# Patient Record
Sex: MALE | Race: WHITE | Employment: UNEMPLOYED | URBAN - METROPOLITAN AREA
[De-identification: names, ages, dates, MRNs, and addresses within clinical notes are randomized per-mention and may not be internally consistent; named-entity substitution may affect disease eponyms.]

---

## 2018-05-25 ENCOUNTER — OFFICE VISIT (OUTPATIENT)
Dept: FAMILY MEDICINE | Facility: CLINIC | Age: 43
End: 2018-05-25
Payer: COMMERCIAL

## 2018-05-25 VITALS
HEART RATE: 78 BPM | DIASTOLIC BLOOD PRESSURE: 80 MMHG | OXYGEN SATURATION: 98 % | SYSTOLIC BLOOD PRESSURE: 115 MMHG | HEIGHT: 65 IN | WEIGHT: 150 LBS | TEMPERATURE: 98 F | BODY MASS INDEX: 24.99 KG/M2

## 2018-05-25 DIAGNOSIS — Z00.00 ENCOUNTER FOR GENERAL ADULT MEDICAL EXAMINATION WITHOUT ABNORMAL FINDINGS: Primary | ICD-10-CM

## 2018-05-25 DIAGNOSIS — Z13.1 SCREENING FOR DIABETES MELLITUS: ICD-10-CM

## 2018-05-25 DIAGNOSIS — Z82.49 FAMILY HISTORY OF EARLY CAD: ICD-10-CM

## 2018-05-25 DIAGNOSIS — E78.5 HYPERLIPIDEMIA LDL GOAL <130: ICD-10-CM

## 2018-05-25 LAB
ALBUMIN SERPL-MCNC: 4.2 G/DL (ref 3.4–5)
ALP SERPL-CCNC: 107 U/L (ref 40–150)
ALT SERPL W P-5'-P-CCNC: 16 U/L (ref 0–70)
ANION GAP SERPL CALCULATED.3IONS-SCNC: 8 MMOL/L (ref 3–14)
AST SERPL W P-5'-P-CCNC: 18 U/L (ref 0–45)
BILIRUB SERPL-MCNC: 0.7 MG/DL (ref 0.2–1.3)
BUN SERPL-MCNC: 8 MG/DL (ref 7–30)
CALCIUM SERPL-MCNC: 9.2 MG/DL (ref 8.5–10.1)
CHLORIDE SERPL-SCNC: 103 MMOL/L (ref 94–109)
CHOLEST SERPL-MCNC: 275 MG/DL
CO2 SERPL-SCNC: 27 MMOL/L (ref 20–32)
CREAT SERPL-MCNC: 0.77 MG/DL (ref 0.66–1.25)
GFR SERPL CREATININE-BSD FRML MDRD: >90 ML/MIN/1.7M2
GLUCOSE SERPL-MCNC: 75 MG/DL (ref 70–99)
HDLC SERPL-MCNC: 45 MG/DL
LDLC SERPL CALC-MCNC: 215 MG/DL
NONHDLC SERPL-MCNC: 230 MG/DL
POTASSIUM SERPL-SCNC: 3.9 MMOL/L (ref 3.4–5.3)
PROT SERPL-MCNC: 7.9 G/DL (ref 6.8–8.8)
SODIUM SERPL-SCNC: 138 MMOL/L (ref 133–144)
TRIGL SERPL-MCNC: 77 MG/DL

## 2018-05-25 PROCEDURE — 99396 PREV VISIT EST AGE 40-64: CPT | Performed by: INTERNAL MEDICINE

## 2018-05-25 PROCEDURE — 80053 COMPREHEN METABOLIC PANEL: CPT | Performed by: INTERNAL MEDICINE

## 2018-05-25 PROCEDURE — 80061 LIPID PANEL: CPT | Performed by: INTERNAL MEDICINE

## 2018-05-25 PROCEDURE — 36415 COLL VENOUS BLD VENIPUNCTURE: CPT | Performed by: INTERNAL MEDICINE

## 2018-05-25 NOTE — PROGRESS NOTES
SUBJECTIVE:   CC: Shiv Terrell is an 43 year old male who presents for preventative health visit.     Healthy Habits:    Do you get at least three servings of calcium containing foods daily (dairy, green leafy vegetables, etc.)? yes    Amount of exercise or daily activities, outside of work: 1-2 day(s) per week    Problems taking medications regularly not applicable    Medication side effects: No    Have you had an eye exam in the past two years? no    Do you see a dentist twice per year? no    Do you have sleep apnea, excessive snoring or daytime drowsiness?yes     Blood Work: A1C, LDL  He is following ketogenic diet for a few months.  Father  age 48 of MI.  He was non-smoker.  He had no other medical problems, he weighed 250 lbs.  HCM:  Discussed HIV test, he declined      Today's PHQ-2 Score:   PHQ-2 (  [employer]) 2012   Q1: Little interest or pleasure in doing things 0   Q2: Feeling down, depressed or hopeless 0   PHQ-2 Score 0       Abuse: Current or Past(Physical, Sexual or Emotional)- No  Do you feel safe in your environment - Yes    Social History   Substance Use Topics     Smoking status: Never Smoker     Smokeless tobacco: Never Used     Alcohol use No      If you drink alcohol do you typically have >3 drinks per day or >7 drinks per week? No                      Last PSA: No results found for: PSA    Reviewed orders with patient. Reviewed health maintenance and updated orders accordingly - Yes  No current outpatient prescriptions on file.       Reviewed and updated as needed this visit by clinical staff  Tobacco  Allergies  Meds  Med Hx  Surg Hx  Fam Hx  Soc Hx        Reviewed and updated as needed this visit by Provider  Tobacco  Fam Hx  Soc Hx         ROS:  CONSTITUTIONAL: NEGATIVE for fever, chills, change in weight  INTEGUMENTARY/SKIN: NEGATIVE for worrisome rashes, moles or lesions  EYES: NEGATIVE for vision changes or irritation  ENT: NEGATIVE for ear, mouth and throat  "problems  RESP: NEGATIVE for significant cough or SOB  CV: NEGATIVE for chest pain, palpitations or peripheral edema  GI: NEGATIVE for nausea, abdominal pain, heartburn, or change in bowel habits   male: negative for dysuria, hematuria, decreased urinary stream, erectile dysfunction, urethral discharge  MUSCULOSKELETAL: NEGATIVE for significant arthralgias or myalgia  NEURO: NEGATIVE for weakness, dizziness or paresthesias  PSYCHIATRIC: NEGATIVE for changes in mood or affect    OBJECTIVE:   /80 (BP Location: Right arm, Patient Position: Chair, Cuff Size: Adult Regular)  Pulse 78  Temp 98  F (36.7  C) (Tympanic)  Ht 5' 5.35\" (1.66 m)  Wt 150 lb (68 kg)  SpO2 98%  BMI 24.69 kg/m2  EXAM:  GENERAL: healthy, alert and no distress  EYES: Eyes grossly normal to inspection, PERRL and conjunctivae and sclerae normal  HENT: ear canals and TM's normal, nose and mouth without ulcers or lesions  NECK: no adenopathy, no asymmetry, masses, or scars and thyroid normal to palpation  RESP: lungs clear to auscultation - no rales, rhonchi or wheezes  CV: regular rate and rhythm, normal S1 S2, no S3 or S4, no murmur, click or rub, no peripheral edema and peripheral pulses strong  ABDOMEN: soft, nontender, no hepatosplenomegaly, no masses and bowel sounds normal  MS: no gross musculoskeletal defects noted, no edema  SKIN: no suspicious lesions or rashes  NEURO: Normal strength and tone, mentation intact and speech normal  PSYCH: mentation appears normal, affect normal/bright    ASSESSMENT/PLAN:   1. Encounter for general adult medical examination without abnormal findings    2. Family history of early CAD - given that his father  of MI age 48 with no other risk factors except obesity, if his cholesterol returns high again, then I would recommend CT Coronary Calcium score to better risk stratify him.  He is not seeming very interested in statin, but could readdress depending on how high his lipids are  - Comprehensive " "metabolic panel    3. Hyperlipidemia LDL goal <130  - Lipid panel reflex to direct LDL Fasting  - Comprehensive metabolic panel    4. Screening for diabetes mellitus  - Comprehensive metabolic panel    COUNSELING:  Reviewed preventive health counseling, as reflected in patient instructions       reports that he has never smoked. He has never used smokeless tobacco.    Estimated body mass index is 24.69 kg/(m^2) as calculated from the following:    Height as of this encounter: 5' 5.35\" (1.66 m).    Weight as of this encounter: 150 lb (68 kg).       Counseling Resources:  ATP IV Guidelines  Pooled Cohorts Equation Calculator  FRAX Risk Assessment  ICSI Preventive Guidelines  Dietary Guidelines for Americans, 2010  USDA's MyPlate  ASA Prophylaxis  Lung CA Screening    Genoveva Dawn,   [facility]  "

## 2018-05-25 NOTE — MR AVS SNAPSHOT
After Visit Summary   5/25/2018    Shiv Terrell    MRN: [id number]           Patient Information     Date Of Birth          1975        Visit Information        Provider Department      5/25/2018 2:00 PM Genoveva Dawn,  [facility]        Today's Diagnoses     Encounter for general adult medical examination without abnormal findings    -  1    Family history of early CAD        Hyperlipidemia LDL goal <130        Screening for diabetes mellitus          Care Instructions      Preventive Health Recommendations  Male Ages 40 to 49    Yearly exam:             See your health care provider every year in order to  o   Review health changes.   o   Discuss preventive care.    o   Review your medicines if your doctor has prescribed any.    You should be tested each year for STDs (sexually transmitted diseases) if you re at risk.     Have a cholesterol test every 5 years.     Have a colonoscopy (test for colon cancer) if someone in your family has had colon cancer or polyps before age 50.     After age 45, have a diabetes test (fasting glucose). If you are at risk for diabetes, you should have this test every 3 years.      Talk with your health care provider about whether or not a prostate cancer screening test (PSA) is right for you.    Shots: Get a flu shot each year. Get a tetanus shot every 10 years.     Nutrition:    Eat at least 5 servings of fruits and vegetables daily.     Eat whole-grain bread, whole-wheat pasta and brown rice instead of white grains and rice.     Talk to your provider about Calcium and Vitamin D.     Lifestyle    Exercise for at least 150 minutes a week (30 minutes a day, 5 days a week). This will help you control your weight and prevent disease.     Limit alcohol to one drink per day.     No smoking.     Wear sunscreen to prevent skin cancer.     See your dentist every six months for an exam and cleaning.      1. Blood work today  2. If cholesterol remains  "high, Dr. Dawn would recommend a CT calcium test to help determine your risk of Cardiovascular disease.          Follow-ups after your visit        Who to contact     If you have questions or need follow up information about today's clinic visit or your schedule please contact [facility] directly at [phone].  Normal or non-critical lab and imaging results will be communicated to you by MyChart, letter or phone within 4 business days after the clinic has received the results. If you do not hear from us within 7 days, please contact the clinic through MyChart or phone. If you have a critical or abnormal lab result, we will notify you by phone as soon as possible.  Submit refill requests through [facility] or call your pharmacy and they will forward the refill request to us. Please allow 3 business days for your refill to be completed.          Additional Information About Your Visit        MyChart Information     [facility] lets you send messages to your doctor, view your test results, renew your prescriptions, schedule appointments and more. To sign up, go to [email or web address] . Click on \"Log in\" on the left side of the screen, which will take you to the Welcome page. Then click on \"Sign up Now\" on the right side of the page.     You will be asked to enter the access code listed below, as well as some personal information. Please follow the directions to create your username and password.     Your access code is: [email or web address]  Expires: 2018  2:01 PM     Your access code will  in 90 days. If you need help or a new code, please call your [facility] or [phone].        Care EveryWhere ID     This is your Care EveryWhere ID. This could be used by other organizations to access your [place] medical records  [id number]        Your Vitals Were     Pulse Temperature Height Pulse Oximetry BMI (Body Mass Index)       78 98  F (36.7  C) (Tympanic) 5' 5.35\" (1.66 m) 98% 24.69 kg/m2     "    Blood Pressure from Last 3 Encounters:   05/25/18 115/80   12/28/16 116/72   03/24/15 126/68    Weight from Last 3 Encounters:   05/25/18 150 lb (68 kg)   12/28/16 170 lb 6.4 oz (77.3 kg)   03/24/15 160 lb 3.2 oz (72.7 kg)              We Performed the Following     Comprehensive metabolic panel     Lipid panel reflex to direct LDL Fasting        Primary Care Provider Office Phone # Fax #    Lori Misty Heath [phone]       [address] [address]        Equal Access to Services     ASHWINI SOLOMON : Hadii zenobia Love, wafredada mayi, qaybta kaalmada michael, jose carlos mcneal. So [facility] [phone].    ATENCIÓN: Si habla español, tiene a chavez disposición servicios gratuitos de asistencia lingüística. [facility] [phone].    We comply with applicable federal civil rights laws and [place] laws. We do not discriminate on the basis of race, color, national origin, age, disability, sex, sexual orientation, or gender identity.            Thank you!     Thank you for choosing [facility]  for your care. Our goal is always to provide you with excellent care. Hearing back from our patients is one way we can continue to improve our services. Please take a few minutes to complete the written survey that you may receive in the mail after your visit with us. Thank you!             Your Updated Medication List - Protect others around you: Learn how to safely use, store and throw away your medicines at www.disposemymeds.org.      Notice  As of 5/25/2018  2:50 PM    You have not been prescribed any medications.

## 2018-05-25 NOTE — PATIENT INSTRUCTIONS
Preventive Health Recommendations  Male Ages 40 to 49    Yearly exam:             See your health care provider every year in order to  o   Review health changes.   o   Discuss preventive care.    o   Review your medicines if your doctor has prescribed any.    You should be tested each year for STDs (sexually transmitted diseases) if you re at risk.     Have a cholesterol test every 5 years.     Have a colonoscopy (test for colon cancer) if someone in your family has had colon cancer or polyps before age 50.     After age 45, have a diabetes test (fasting glucose). If you are at risk for diabetes, you should have this test every 3 years.      Talk with your health care provider about whether or not a prostate cancer screening test (PSA) is right for you.    Shots: Get a flu shot each year. Get a tetanus shot every 10 years.     Nutrition:    Eat at least 5 servings of fruits and vegetables daily.     Eat whole-grain bread, whole-wheat pasta and brown rice instead of white grains and rice.     Talk to your provider about Calcium and Vitamin D.     Lifestyle    Exercise for at least 150 minutes a week (30 minutes a day, 5 days a week). This will help you control your weight and prevent disease.     Limit alcohol to one drink per day.     No smoking.     Wear sunscreen to prevent skin cancer.     See your dentist every six months for an exam and cleaning.      1. Blood work today  2. If cholesterol remains high, Dr. Dawn would recommend a CT calcium test to help determine your risk of Cardiovascular disease.

## 2018-05-25 NOTE — LETTER
May 29, 2018      Shiv Terrell  [address] [address] [facility] [place] [facility] [address]        Dear ,    We are writing to inform you of your test results.  Kidney, liver function and electrolytes are normal.    Resulted Orders   Lipid panel reflex to direct LDL Fasting   Result Value Ref Range    Cholesterol 275 (H) <200 mg/dL      Comment:      Desirable:       <200 mg/dl    Triglycerides 77 <150 mg/dL      Comment:      Fasting specimen    HDL Cholesterol 45 >39 mg/dL    LDL Cholesterol Calculated 215 (H) <100 mg/dL      Comment:      Above desirable:  100-129 mg/dl  Borderline High:  130-159 mg/dL  High:             160-189 mg/dL  Very high:       >189 mg/dl      Non HDL Cholesterol 230 (H) <130 mg/dL      Comment:      Above Desirable:  130-159 mg/dl  Borderline high:  160-189 mg/dl  High:             190-219 mg/dl  Very high:       >219 mg/dl     Comprehensive metabolic panel   Result Value Ref Range    Sodium 138 133 - 144 mmol/L    Potassium 3.9 3.4 - 5.3 mmol/L    Chloride 103 94 - 109 mmol/L    Carbon Dioxide 27 20 - 32 mmol/L    Anion Gap 8 3 - 14 mmol/L    Glucose 75 70 - 99 mg/dL      Comment:      Fasting specimen    Urea Nitrogen 8 7 - 30 mg/dL    Creatinine 0.77 0.66 - 1.25 mg/dL    GFR Estimate >90 >60 mL/min/1.7m2      Comment:      Non  GFR Calc    GFR Estimate If Black >90 >60 mL/min/1.7m2      Comment:       GFR Calc    Calcium 9.2 8.5 - 10.1 mg/dL    Bilirubin Total 0.7 0.2 - 1.3 mg/dL    Albumin 4.2 3.4 - 5.0 g/dL    Protein Total 7.9 6.8 - 8.8 g/dL    Alkaline Phosphatase 107 40 - 150 U/L    ALT 16 0 - 70 U/L    AST 18 0 - 45 U/L       If you have any questions or concerns, please call the clinic at the number listed above.       Sincerely,        Genoveva Dawn, DO

## 2019-03-11 ENCOUNTER — TELEPHONE (OUTPATIENT)
Dept: FAMILY MEDICINE | Facility: CLINIC | Age: 44
End: 2019-03-11

## 2019-03-11 DIAGNOSIS — Z82.49 FAMILY HISTORY OF EARLY CAD: Primary | ICD-10-CM

## 2019-03-11 DIAGNOSIS — E78.5 HYPERLIPIDEMIA LDL GOAL <130: ICD-10-CM

## 2019-03-11 NOTE — TELEPHONE ENCOUNTER
Reason for Call: Request for an order or referral:    Order or referral being requested: Labs, Lipid panel and calcium    Date needed: as soon as possible    Has the patient been seen by the PCP for this problem? YES    Additional comments: Pt has appointment 3/20/19 with Dr Dawn. He wants his labs ahead of time.  Please call when order is placed.    Phone number Patient can be reached at:  Cell number on file:    Telephone Information:   Mobile [phone]       Best Time:  any    Can we leave a detailed message on this number?  YES    Call taken on 3/11/2019 at 11:13 AM by Carly Doyle

## 2019-03-11 NOTE — TELEPHONE ENCOUNTER
Patient informed of message below from provider.  Scheduled for 3/14/19 11:05 am fasting lab only appointment.

## 2019-03-11 NOTE — TELEPHONE ENCOUNTER
Pended CMP and Lipids. Please place any others if needed.     Please ask patient when calling back if patient would like to elect Dr. Dawn as PCP.    Provider please review and advise. Thank you.

## 2019-03-14 DIAGNOSIS — E78.5 HYPERLIPIDEMIA LDL GOAL <130: ICD-10-CM

## 2019-03-14 DIAGNOSIS — Z82.49 FAMILY HISTORY OF EARLY CAD: ICD-10-CM

## 2019-03-14 LAB
ALBUMIN SERPL-MCNC: 3.9 G/DL (ref 3.4–5)
ALP SERPL-CCNC: 103 U/L (ref 40–150)
ALT SERPL W P-5'-P-CCNC: 31 U/L (ref 0–70)
ANION GAP SERPL CALCULATED.3IONS-SCNC: 6 MMOL/L (ref 3–14)
AST SERPL W P-5'-P-CCNC: 15 U/L (ref 0–45)
BILIRUB SERPL-MCNC: 1 MG/DL (ref 0.2–1.3)
BUN SERPL-MCNC: 14 MG/DL (ref 7–30)
CALCIUM SERPL-MCNC: 8.6 MG/DL (ref 8.5–10.1)
CHLORIDE SERPL-SCNC: 101 MMOL/L (ref 94–109)
CHOLEST SERPL-MCNC: 435 MG/DL
CO2 SERPL-SCNC: 27 MMOL/L (ref 20–32)
CREAT SERPL-MCNC: 0.84 MG/DL (ref 0.66–1.25)
GFR SERPL CREATININE-BSD FRML MDRD: >90 ML/MIN/{1.73_M2}
GLUCOSE SERPL-MCNC: 88 MG/DL (ref 70–99)
HDLC SERPL-MCNC: 56 MG/DL
LDLC SERPL CALC-MCNC: 366 MG/DL
NONHDLC SERPL-MCNC: 379 MG/DL
POTASSIUM SERPL-SCNC: 3.7 MMOL/L (ref 3.4–5.3)
PROT SERPL-MCNC: 7.5 G/DL (ref 6.8–8.8)
SODIUM SERPL-SCNC: 134 MMOL/L (ref 133–144)
TRIGL SERPL-MCNC: 66 MG/DL

## 2019-03-14 PROCEDURE — 36415 COLL VENOUS BLD VENIPUNCTURE: CPT | Performed by: INTERNAL MEDICINE

## 2019-03-14 PROCEDURE — 80061 LIPID PANEL: CPT | Performed by: INTERNAL MEDICINE

## 2019-03-14 PROCEDURE — 80053 COMPREHEN METABOLIC PANEL: CPT | Performed by: INTERNAL MEDICINE

## 2019-03-20 ENCOUNTER — OFFICE VISIT (OUTPATIENT)
Dept: FAMILY MEDICINE | Facility: CLINIC | Age: 44
End: 2019-03-20
Payer: COMMERCIAL

## 2019-03-20 VITALS
HEIGHT: 65 IN | HEART RATE: 74 BPM | BODY MASS INDEX: 24.68 KG/M2 | WEIGHT: 148.13 LBS | SYSTOLIC BLOOD PRESSURE: 110 MMHG | TEMPERATURE: 97.8 F | DIASTOLIC BLOOD PRESSURE: 80 MMHG | OXYGEN SATURATION: 97 %

## 2019-03-20 DIAGNOSIS — Z82.49 FAMILY HISTORY OF EARLY CAD: ICD-10-CM

## 2019-03-20 DIAGNOSIS — E78.5 HYPERLIPIDEMIA LDL GOAL <130: Primary | ICD-10-CM

## 2019-03-20 PROCEDURE — 99214 OFFICE O/P EST MOD 30 MIN: CPT | Performed by: INTERNAL MEDICINE

## 2019-03-20 ASSESSMENT — PAIN SCALES - GENERAL: PAINLEVEL: NO PAIN (0)

## 2019-03-20 ASSESSMENT — MIFFLIN-ST. JEOR: SCORE: 1509.32

## 2019-03-20 NOTE — PATIENT INSTRUCTIONS
1. Consider seeing Cardiovascular Medicine - Dr. Thomason, lipidologist at [facility] in [place]  2. Get CT calcium score.  Radiology test was ordered.  Please call [phone] to schedule.  3. We discussed statin but you decided against this for now.   4. Recommend Mediterranean diet. [facility] has good research/resources on this.

## 2019-03-20 NOTE — PROGRESS NOTES
SUBJECTIVE:   Shiv Terrell is a 43 year old male who presents to clinic today for the following health issues:    - Follow up after having labs completed 3/14/2019.    Patient is not on any current medications. He does have family history of hyperlipidemia, mother and father. He is eating an all meat diet and averaging 2-3 days per week of exercise.  He stopped eating vegetables, was previously following a ketogenic diet.  He was reading that all meat diet improves brain cognition.   We had discussed CAC score which he did not do last year, but wants to have done now.      Results for orders placed or performed in visit on 03/14/19   Lipid panel reflex to direct LDL Fasting   Result Value Ref Range    Cholesterol 435 (H) <200 mg/dL    Triglycerides 66 <150 mg/dL    HDL Cholesterol 56 >39 mg/dL    LDL Cholesterol Calculated 366 (H) <100 mg/dL    Non HDL Cholesterol 379 (H) <130 mg/dL   Comprehensive metabolic panel (BMP + Alb, Alk Phos, ALT, AST, Total. Bili, TP)   Result Value Ref Range    Sodium 134 133 - 144 mmol/L    Potassium 3.7 3.4 - 5.3 mmol/L    Chloride 101 94 - 109 mmol/L    Carbon Dioxide 27 20 - 32 mmol/L    Anion Gap 6 3 - 14 mmol/L    Glucose 88 70 - 99 mg/dL    Urea Nitrogen 14 7 - 30 mg/dL    Creatinine 0.84 0.66 - 1.25 mg/dL    GFR Estimate >90 >60 mL/min/[1.73_m2]    GFR Estimate If Black >90 >60 mL/min/[1.73_m2]    Calcium 8.6 8.5 - 10.1 mg/dL    Bilirubin Total 1.0 0.2 - 1.3 mg/dL    Albumin 3.9 3.4 - 5.0 g/dL    Protein Total 7.5 6.8 - 8.8 g/dL    Alkaline Phosphatase 103 40 - 150 U/L    ALT 31 0 - 70 U/L    AST 15 0 - 45 U/L             No current outpatient medications on file.       Reviewed and updated as needed this visit by clinical staff  Tobacco  Problems  Med Hx  Surg Hx  Fam Hx  Soc Hx      Reviewed and updated as needed this visit by Provider  Tobacco  Problems  Med Hx  Surg Hx  Fam Hx  Soc Hx        ROS:  Constitutional, HEENT, cardiovascular, pulmonary, gi and gu systems  "are negative, except as otherwise noted.    OBJECTIVE:     /80   Pulse 74   Temp 97.8  F (36.6  C) (Tympanic)   Ht 1.66 m (5' 5.35\")   Wt 67.2 kg (148 lb 2 oz)   SpO2 97%   BMI 24.39 kg/m    Body mass index is 24.39 kg/m .  GENERAL APPEARANCE: healthy, alert and no distress    Diagnostic Test Results:  Results for orders placed or performed in visit on 03/14/19   Lipid panel reflex to direct LDL Fasting   Result Value Ref Range    Cholesterol 435 (H) <200 mg/dL    Triglycerides 66 <150 mg/dL    HDL Cholesterol 56 >39 mg/dL    LDL Cholesterol Calculated 366 (H) <100 mg/dL    Non HDL Cholesterol 379 (H) <130 mg/dL   Comprehensive metabolic panel (BMP + Alb, Alk Phos, ALT, AST, Total. Bili, TP)   Result Value Ref Range    Sodium 134 133 - 144 mmol/L    Potassium 3.7 3.4 - 5.3 mmol/L    Chloride 101 94 - 109 mmol/L    Carbon Dioxide 27 20 - 32 mmol/L    Anion Gap 6 3 - 14 mmol/L    Glucose 88 70 - 99 mg/dL    Urea Nitrogen 14 7 - 30 mg/dL    Creatinine 0.84 0.66 - 1.25 mg/dL    GFR Estimate >90 >60 mL/min/[1.73_m2]    GFR Estimate If Black >90 >60 mL/min/[1.73_m2]    Calcium 8.6 8.5 - 10.1 mg/dL    Bilirubin Total 1.0 0.2 - 1.3 mg/dL    Albumin 3.9 3.4 - 5.0 g/dL    Protein Total 7.5 6.8 - 8.8 g/dL    Alkaline Phosphatase 103 40 - 150 U/L    ALT 31 0 - 70 U/L    AST 15 0 - 45 U/L       ASSESSMENT/PLAN:         ICD-10-CM    1. Hyperlipidemia LDL goal <130 E78.5 CT Coronary Calcium Scan   2. Family history of early CAD Z82.49 CT Coronary Calcium Scan     He has done extensive research on the Internet about cholesterol.  He has many questions about different size particles of cholesterol, genetics of cholesterol, proper diet to eat.  Advised to see the lipid neurologist as below, patient hesitant.  He is still strongly opposed to starting a statin.  He feels his high cholesterol is primarily attributed to his diet, and his dad heart attack is primarily attributed to his obesity.  He is agreeable to a CT " coronary calcium scan, and stated if his results were very low, he would continue to decline a statin.  Advised that the best heart healthy diet is a Mediterranean diet, and advised against his all meat diet    Patient Instructions   1. Consider seeing Cardiovascular Medicine - Dr. Thomason, lipidologist at [facility] in [place]  2. Get CT calcium score.  Radiology test was ordered.  Please call [phone] to schedule.  3. We discussed statin but you decided against this for now.   4. Recommend Mediterranean diet. [facility] has good research/resources on this.    Greater than 25 minutes was spent face-to-face with the patient by the provider.  Greater than 50% was spent in counseling or coordinating care for this patient.      Dr. Genoveva Dawn,   [facility] Internal Medicine

## 2019-03-26 ENCOUNTER — HOSPITAL ENCOUNTER (OUTPATIENT)
Dept: CT IMAGING | Facility: CLINIC | Age: 44
Discharge: HOME | End: 2019-03-26
Attending: INTERNAL MEDICINE | Admitting: INTERNAL MEDICINE
Payer: COMMERCIAL

## 2019-03-26 DIAGNOSIS — Z82.49 FAMILY HISTORY OF EARLY CAD: ICD-10-CM

## 2019-03-26 DIAGNOSIS — E78.5 HYPERLIPIDEMIA LDL GOAL <130: ICD-10-CM

## 2019-03-26 PROCEDURE — 75571 CT HRT W/O DYE W/CA TEST: CPT

## 2019-03-26 PROCEDURE — 75571 CT HRT W/O DYE W/CA TEST: CPT | Mod: 27 | Performed by: INTERNAL MEDICINE

## 2020-03-01 ENCOUNTER — HEALTH MAINTENANCE LETTER (OUTPATIENT)
Age: 45
End: 2020-03-01

## 2020-12-14 ENCOUNTER — HEALTH MAINTENANCE LETTER (OUTPATIENT)
Age: 45
End: 2020-12-14

## 2021-04-17 ENCOUNTER — HEALTH MAINTENANCE LETTER (OUTPATIENT)
Age: 46
End: 2021-04-17

## 2021-10-02 ENCOUNTER — HEALTH MAINTENANCE LETTER (OUTPATIENT)
Age: 46
End: 2021-10-02

## 2022-05-14 ENCOUNTER — HEALTH MAINTENANCE LETTER (OUTPATIENT)
Age: 47
End: 2022-05-14

## 2022-09-03 ENCOUNTER — HEALTH MAINTENANCE LETTER (OUTPATIENT)
Age: 47
End: 2022-09-03

## 2023-06-03 ENCOUNTER — HEALTH MAINTENANCE LETTER (OUTPATIENT)
Age: 48
End: 2023-06-03